# Patient Record
Sex: FEMALE | HISPANIC OR LATINO
[De-identification: names, ages, dates, MRNs, and addresses within clinical notes are randomized per-mention and may not be internally consistent; named-entity substitution may affect disease eponyms.]

---

## 2023-09-06 ENCOUNTER — TRANSCRIPTION ENCOUNTER (OUTPATIENT)
Age: 28
End: 2023-09-06

## 2023-09-06 ENCOUNTER — APPOINTMENT (OUTPATIENT)
Dept: OBGYN | Facility: CLINIC | Age: 28
End: 2023-09-06
Payer: COMMERCIAL

## 2023-09-06 ENCOUNTER — NON-APPOINTMENT (OUTPATIENT)
Age: 28
End: 2023-09-06

## 2023-09-06 VITALS
SYSTOLIC BLOOD PRESSURE: 113 MMHG | BODY MASS INDEX: 25.72 KG/M2 | HEIGHT: 60 IN | DIASTOLIC BLOOD PRESSURE: 78 MMHG | OXYGEN SATURATION: 98 % | HEART RATE: 76 BPM | WEIGHT: 131 LBS

## 2023-09-06 DIAGNOSIS — N93.9 ABNORMAL UTERINE AND VAGINAL BLEEDING, UNSPECIFIED: ICD-10-CM

## 2023-09-06 DIAGNOSIS — Z78.9 OTHER SPECIFIED HEALTH STATUS: ICD-10-CM

## 2023-09-06 DIAGNOSIS — E28.2 POLYCYSTIC OVARIAN SYNDROME: ICD-10-CM

## 2023-09-06 DIAGNOSIS — B97.7 PAPILLOMAVIRUS AS THE CAUSE OF DISEASES CLASSIFIED ELSEWHERE: ICD-10-CM

## 2023-09-06 DIAGNOSIS — Z82.49 FAMILY HISTORY OF ISCHEMIC HEART DISEASE AND OTHER DISEASES OF THE CIRCULATORY SYSTEM: ICD-10-CM

## 2023-09-06 PROBLEM — Z00.00 ENCOUNTER FOR PREVENTIVE HEALTH EXAMINATION: Status: ACTIVE | Noted: 2023-09-06

## 2023-09-06 PROCEDURE — 99203 OFFICE O/P NEW LOW 30 MIN: CPT

## 2023-09-06 RX ORDER — HYDROXYZINE HCL 10 MG
10 TABLET ORAL
Refills: 0 | Status: ACTIVE | COMMUNITY

## 2023-09-06 RX ORDER — SERTRALINE HYDROCHLORIDE 25 MG/1
TABLET, FILM COATED ORAL
Refills: 0 | Status: ACTIVE | COMMUNITY

## 2023-09-06 NOTE — PLAN
[FreeTextEntry1] : d/w pt unlikely pcos-will redraw testosterone off usg to eval ovaries in depth discussion with pt regarding pcos; fertility, metabolic implications; pt's questions answered rtn prn

## 2023-09-06 NOTE — PHYSICAL EXAM
[Chaperone Present] : A chaperone was present in the examining room during all aspects of the physical examination [Appropriately responsive] : appropriately responsive [Alert] : alert [No Acute Distress] : no acute distress [No Lymphadenopathy] : no lymphadenopathy [No Murmurs] : no murmurs [Soft] : soft [Non-tender] : non-tender [Non-distended] : non-distended [No HSM] : No HSM [No Lesions] : no lesions [No Mass] : no mass [Oriented x3] : oriented x3 [Labia Majora] : normal [Labia Minora] : normal [Normal] : normal [Uterine Adnexae] : normal [FreeTextEntry1] : nl hair growth pattern

## 2023-09-06 NOTE — HISTORY OF PRESENT ILLNESS
[Patient reported PAP Smear was normal] : Patient reported PAP Smear was normal [N] : Patient denies prior pregnancies [Currently Active] : currently active [Men] : men [No] : No [Yes] : Yes [PapSmeardate] : 2023 [FreeTextEntry1] : 08/17/2023 [FreeTextEntry3] : IUD

## 2023-09-07 ENCOUNTER — TRANSCRIPTION ENCOUNTER (OUTPATIENT)
Age: 28
End: 2023-09-07

## 2023-09-11 ENCOUNTER — TRANSCRIPTION ENCOUNTER (OUTPATIENT)
Age: 28
End: 2023-09-11

## 2023-09-11 ENCOUNTER — NON-APPOINTMENT (OUTPATIENT)
Age: 28
End: 2023-09-11

## 2023-09-14 LAB
TESTOST FREE SERPL-MCNC: 1.8 PG/ML
TESTOST SERPL-MCNC: 12.4 NG/DL

## 2024-10-25 ENCOUNTER — NON-APPOINTMENT (OUTPATIENT)
Age: 29
End: 2024-10-25

## 2024-10-25 ENCOUNTER — OUTPATIENT (OUTPATIENT)
Dept: OUTPATIENT SERVICES | Facility: HOSPITAL | Age: 29
LOS: 1 days | End: 2024-10-25
Payer: COMMERCIAL

## 2024-10-25 DIAGNOSIS — M25.572 PAIN IN LEFT ANKLE AND JOINTS OF LEFT FOOT: ICD-10-CM

## 2024-10-25 PROCEDURE — 73610 X-RAY EXAM OF ANKLE: CPT | Mod: 26,LT

## 2024-10-25 PROCEDURE — 73610 X-RAY EXAM OF ANKLE: CPT | Mod: LT

## 2024-10-26 DIAGNOSIS — M25.572 PAIN IN LEFT ANKLE AND JOINTS OF LEFT FOOT: ICD-10-CM

## 2024-11-04 ENCOUNTER — APPOINTMENT (OUTPATIENT)
Facility: CLINIC | Age: 29
End: 2024-11-04
Payer: COMMERCIAL

## 2024-11-04 VITALS — BODY MASS INDEX: 25.72 KG/M2 | HEIGHT: 60 IN | WEIGHT: 131 LBS

## 2024-11-04 VITALS — HEIGHT: 60 IN

## 2024-11-04 DIAGNOSIS — S93.492A SPRAIN OF OTHER LIGAMENT OF LEFT ANKLE, INITIAL ENCOUNTER: ICD-10-CM

## 2024-11-04 PROCEDURE — 99204 OFFICE O/P NEW MOD 45 MIN: CPT

## 2024-11-12 ENCOUNTER — APPOINTMENT (OUTPATIENT)
Dept: ORTHOPEDIC SURGERY | Facility: CLINIC | Age: 29
End: 2024-11-12